# Patient Record
Sex: FEMALE | Race: BLACK OR AFRICAN AMERICAN | NOT HISPANIC OR LATINO | Employment: FULL TIME | URBAN - METROPOLITAN AREA
[De-identification: names, ages, dates, MRNs, and addresses within clinical notes are randomized per-mention and may not be internally consistent; named-entity substitution may affect disease eponyms.]

---

## 2023-04-22 PROBLEM — Z17.0 MALIGNANT NEOPLASM OF UPPER-OUTER QUADRANT OF LEFT BREAST IN FEMALE, ESTROGEN RECEPTOR POSITIVE: Status: ACTIVE | Noted: 2023-04-22

## 2023-04-22 PROBLEM — C50.412 MALIGNANT NEOPLASM OF UPPER-OUTER QUADRANT OF LEFT BREAST IN FEMALE, ESTROGEN RECEPTOR POSITIVE: Status: ACTIVE | Noted: 2023-04-22

## 2023-04-22 PROBLEM — N63.10 MASS OF RIGHT BREAST: Status: ACTIVE | Noted: 2023-04-22

## 2023-05-04 PROBLEM — Z17.0 ESTROGEN RECEPTOR POSITIVE: Status: ACTIVE | Noted: 2023-05-04

## 2023-05-04 PROBLEM — N63.14 MASS OF LOWER INNER QUADRANT OF RIGHT BREAST: Status: ACTIVE | Noted: 2023-04-22

## 2023-05-04 NOTE — PROGRESS NOTES
[facility]  MD Renea Taylor, NP-C    Chief Complaint:   Ghazala Trujillo is a 48 y.o. female presenting today for  3 month follow up for her breast cancer issues. She is due for Physical Examination  She reports no interval changes on her self-breast examination.     History of Present Illness:   Mrs. Trujillo was diagnosed with left breast cancer in September 2021 at the age of 46. ER/AK Positive and HER 2 FISH negative. [employer] Breast Cancer Panel with Add-On Genes Negative (2021). She elected mastectomy that showed a 3.9 cm Grade III IDC with DCIS. Margins and LNs were negative. T2N0. She did not require adjuvant chemotherapy or radiation. She's been on Tamoxifen since October 2021. MDs::: Jacek Vasquez MD; Brando Watts MD; Chica Carter MD.    Past Medical History:   Diagnosis Date    Breast cancer     left, stage 1B    History of abnormal cervical Pap smear     Malignant neoplasm of upper-outer quadrant of left breast in female, estrogen receptor positive 4/22/2023    Mass of lower inner quadrant of right breast 4/22/2023    Mass of right breast 4/22/2023      Past Surgical History:   Procedure Laterality Date    AUGMENTATION OF BREAST Left     after mastectomy    BREAST BIOPSY Left 09/2021    MASTECTOMY Left 10/07/2021        Current Outpatient Medications:     tamoxifen (NOLVADEX) 20 MG Tab, Take 1 tablet by mouth once daily., Disp: , Rfl:    Review of patient's allergies indicates:  No Known Allergies   Social History     Tobacco Use    Smoking status: Never    Smokeless tobacco: Never   Substance Use Topics    Alcohol use: Not on file      Family History   Problem Relation Age of Onset    Diabetes Mother     Dementia Father     Hypertension Father     Diabetes Father     Breast cancer Sister 49        Negative genetic testing    Hyperlipidemia Maternal Grandmother     Dementia Maternal Grandfather     Hyperlipidemia Maternal Grandfather     Stroke  Maternal Grandfather     Heart disease Paternal Grandmother     Heart disease Paternal Grandfather         Review of Systems   Integumentary:  Negative for color change, rash, mole/lesion, breast mass, breast discharge and breast tenderness.   Breast: Negative for mass and tenderness     Physical Exam   Constitutional: She is cooperative.   HENT:   Head: Normocephalic.   Pulmonary/Chest: She exhibits no mass. Right breast exhibits no inverted nipple, no mass, no nipple discharge, no skin change and no tenderness. Left breast exhibits no mass, no skin change and no tenderness. No breast swelling.   The left breast is surgically absent with no signs of malignancy or recurrence.    Abdominal: Normal appearance.   Genitourinary: No breast swelling.   Musculoskeletal: Lymphadenopathy:      Upper Body:      Right upper body: No supraclavicular or axillary adenopathy.      Left upper body: No supraclavicular or axillary adenopathy.     Neurological: She is alert.   Skin: No rash noted.          Assessment/Plan  1. Malignant neoplasm of upper-outer quadrant of left breast in female, estrogen receptor positive  Assessment & Plan:   Patient is doing well and is being followed according to NCCN Guidelines. She understands that her imaging and exams have remained stable and is comfortable being followed in a conservative fashion. She understands the importance of monthly self-breast examination and knows to report any and all changes as they occur.        2. Estrogen receptor positive  Assessment & Plan:  Continue Tamoxifen.      3. Mass of lower inner quadrant of right breast  Assessment & Plan:  We reviewed our findings today and her questions were answered.  She understands that her imaging and exams have remained stable (and show nothing concerning).  She is comfortable being followed in a conservative fashion.      She understands the importance of monthly self-breast examination and knows to report any and all changes as  they occur.               Medical Decision Making:  It is my impression that this patient suffers all conditions contained in this medical document.  Each of these conditions did affect our plan of care and my medical decision making today.  It is my opinion that the medical decision making concerning this patient was of moderate difficulty based on the aforementioned conditions.  Any further recommendations will be communicated to the patient by me.  I have reviewed and verified her allergies, list of medications, medical and surgical histories, social history, and a pertinent review of symptoms.      Follow up:  3 months and prn    For:  PE, Rt. MGM (D) at , and CXR

## 2023-05-05 ENCOUNTER — OFFICE VISIT (OUTPATIENT)
Dept: SURGERY | Facility: CLINIC | Age: 48
End: 2023-05-05
Payer: COMMERCIAL

## 2023-05-05 DIAGNOSIS — Z17.0 MALIGNANT NEOPLASM OF UPPER-OUTER QUADRANT OF LEFT BREAST IN FEMALE, ESTROGEN RECEPTOR POSITIVE: ICD-10-CM

## 2023-05-05 DIAGNOSIS — C50.412 MALIGNANT NEOPLASM OF UPPER-OUTER QUADRANT OF LEFT BREAST IN FEMALE, ESTROGEN RECEPTOR POSITIVE: ICD-10-CM

## 2023-05-05 DIAGNOSIS — N63.14 MASS OF LOWER INNER QUADRANT OF RIGHT BREAST: ICD-10-CM

## 2023-05-05 DIAGNOSIS — Z17.0 ESTROGEN RECEPTOR POSITIVE: ICD-10-CM

## 2023-05-05 LAB
ALBUMIN SERPL BCP-MCNC: 3.8 G/DL (ref 3.5–5.2)
ALP SERPL-CCNC: 44 U/L (ref 55–135)
ALT SERPL W/O P-5'-P-CCNC: 13 U/L (ref 10–44)
ANION GAP SERPL CALC-SCNC: 11 MMOL/L (ref 8–16)
AST SERPL-CCNC: 20 U/L (ref 10–40)
BASOPHILS # BLD AUTO: 0.02 K/UL (ref 0–0.2)
BASOPHILS NFR BLD: 0.4 % (ref 0–1.9)
BILIRUB SERPL-MCNC: 0.4 MG/DL (ref 0.1–1)
BUN SERPL-MCNC: 16 MG/DL (ref 6–20)
CALCIUM SERPL-MCNC: 9.9 MG/DL (ref 8.7–10.5)
CEA SERPL-MCNC: <1.7 NG/ML (ref 0–5)
CHLORIDE SERPL-SCNC: 104 MMOL/L (ref 95–110)
CO2 SERPL-SCNC: 25 MMOL/L (ref 23–29)
CREAT SERPL-MCNC: 0.8 MG/DL (ref 0.5–1.4)
DIFFERENTIAL METHOD: ABNORMAL
EOSINOPHIL # BLD AUTO: 0.2 K/UL (ref 0–0.5)
EOSINOPHIL NFR BLD: 5.2 % (ref 0–8)
ERYTHROCYTE [DISTWIDTH] IN BLOOD BY AUTOMATED COUNT: 14 % (ref 11.5–14.5)
EST. GFR  (NO RACE VARIABLE): >60 ML/MIN/1.73 M^2
GLUCOSE SERPL-MCNC: 90 MG/DL (ref 70–110)
HCT VFR BLD AUTO: 38.8 % (ref 37–48.5)
HGB BLD-MCNC: 12.3 G/DL (ref 12–16)
IMM GRANULOCYTES # BLD AUTO: 0.01 K/UL (ref 0–0.04)
IMM GRANULOCYTES NFR BLD AUTO: 0.2 % (ref 0–0.5)
LDH SERPL L TO P-CCNC: 170 U/L (ref 110–260)
LYMPHOCYTES # BLD AUTO: 2 K/UL (ref 1–4.8)
LYMPHOCYTES NFR BLD: 43.6 % (ref 18–48)
MCH RBC QN AUTO: 28.5 PG (ref 27–31)
MCHC RBC AUTO-ENTMCNC: 31.7 G/DL (ref 32–36)
MCV RBC AUTO: 90 FL (ref 82–98)
MONOCYTES # BLD AUTO: 0.4 K/UL (ref 0.3–1)
MONOCYTES NFR BLD: 8.5 % (ref 4–15)
NEUTROPHILS # BLD AUTO: 1.9 K/UL (ref 1.8–7.7)
NEUTROPHILS NFR BLD: 42.1 % (ref 38–73)
NRBC BLD-RTO: 0 /100 WBC
PLATELET # BLD AUTO: 351 K/UL (ref 150–450)
PMV BLD AUTO: 10.4 FL (ref 9.2–12.9)
POTASSIUM SERPL-SCNC: 4.2 MMOL/L (ref 3.5–5.1)
PROT SERPL-MCNC: 7.3 G/DL (ref 6–8.4)
RBC # BLD AUTO: 4.31 M/UL (ref 4–5.4)
SODIUM SERPL-SCNC: 140 MMOL/L (ref 136–145)
WBC # BLD AUTO: 4.61 K/UL (ref 3.9–12.7)

## 2023-05-05 PROCEDURE — 1160F PR REVIEW ALL MEDS BY PRESCRIBER/CLIN PHARMACIST DOCUMENTED: ICD-10-PCS | Mod: CPTII,S$GLB,, | Performed by: NURSE PRACTITIONER

## 2023-05-05 PROCEDURE — 80053 COMPREHEN METABOLIC PANEL: CPT | Performed by: NURSE PRACTITIONER

## 2023-05-05 PROCEDURE — 1159F MED LIST DOCD IN RCRD: CPT | Mod: CPTII,S$GLB,, | Performed by: NURSE PRACTITIONER

## 2023-05-05 PROCEDURE — 86300 IMMUNOASSAY TUMOR CA 15-3: CPT | Performed by: NURSE PRACTITIONER

## 2023-05-05 PROCEDURE — 99214 PR OFFICE/OUTPT VISIT, EST, LEVL IV, 30-39 MIN: ICD-10-PCS | Mod: S$GLB,,, | Performed by: NURSE PRACTITIONER

## 2023-05-05 PROCEDURE — 83615 LACTATE (LD) (LDH) ENZYME: CPT | Performed by: NURSE PRACTITIONER

## 2023-05-05 PROCEDURE — 1159F PR MEDICATION LIST DOCUMENTED IN MEDICAL RECORD: ICD-10-PCS | Mod: CPTII,S$GLB,, | Performed by: NURSE PRACTITIONER

## 2023-05-05 PROCEDURE — 85025 COMPLETE CBC W/AUTO DIFF WBC: CPT | Performed by: NURSE PRACTITIONER

## 2023-05-05 PROCEDURE — 82378 CARCINOEMBRYONIC ANTIGEN: CPT | Performed by: NURSE PRACTITIONER

## 2023-05-05 PROCEDURE — 1160F RVW MEDS BY RX/DR IN RCRD: CPT | Mod: CPTII,S$GLB,, | Performed by: NURSE PRACTITIONER

## 2023-05-05 PROCEDURE — 99214 OFFICE O/P EST MOD 30 MIN: CPT | Mod: S$GLB,,, | Performed by: NURSE PRACTITIONER

## 2023-05-08 LAB — CANCER AG27-29 SERPL-ACNC: 13 U/ML

## 2023-07-24 DIAGNOSIS — N63.14 MASS OF LOWER INNER QUADRANT OF RIGHT BREAST: Primary | ICD-10-CM

## 2023-07-24 DIAGNOSIS — C50.412 MALIGNANT NEOPLASM OF UPPER-OUTER QUADRANT OF LEFT BREAST IN FEMALE, ESTROGEN RECEPTOR POSITIVE: ICD-10-CM

## 2023-07-24 DIAGNOSIS — Z17.0 MALIGNANT NEOPLASM OF UPPER-OUTER QUADRANT OF LEFT BREAST IN FEMALE, ESTROGEN RECEPTOR POSITIVE: ICD-10-CM

## 2023-07-25 NOTE — PROGRESS NOTES
[facility]  MD Renea Taylor, NP-C    Chief Complaint:   Ghazala Trujillo is a 48 y.o. female presenting today for  3 month follow up for her breast cancer issues. She is due for Physical Examination, Mammogram, and Chest Xray  She reports no interval changes on her self-breast examination.     History of Present Illness:   Mrs. Trujillo was diagnosed with left breast cancer in September 2021 at the age of 46. ER/MT Positive and HER 2 FISH negative. [facility] Breast Cancer Panel with Add-On Genes Negative (2021). She elected mastectomy that showed a 3.9 cm Grade III IDC with DCIS. Margins and LNs were negative. T2N0. She did not require adjuvant chemotherapy or radiation. She's been on Tamoxifen since October 2021. MDs::: Jacek Vasquez MD; Brando Watts MD; Chica Carter MD.    Past Medical History:   Diagnosis Date    Breast cancer     left, stage 1B    History of abnormal cervical Pap smear     Malignant neoplasm of upper-outer quadrant of left breast in female, estrogen receptor positive 4/22/2023    Mass of lower inner quadrant of right breast 4/22/2023    Mass of right breast 4/22/2023      Past Surgical History:   Procedure Laterality Date    AUGMENTATION OF BREAST Left     after mastectomy    BREAST BIOPSY Left 09/2021    MASTECTOMY Left 10/07/2021        Current Outpatient Medications:     tamoxifen (NOLVADEX) 20 MG Tab, Take 1 tablet by mouth once daily., Disp: , Rfl:    Review of patient's allergies indicates:  No Known Allergies   Social History     Tobacco Use    Smoking status: Never    Smokeless tobacco: Never   Substance Use Topics    Alcohol use: Not on file      Family History   Problem Relation Age of Onset    Diabetes Mother     Dementia Father     Hypertension Father     Diabetes Father     Breast cancer Sister 49        Negative genetic testing    Hyperlipidemia Maternal Grandmother     Dementia Maternal Grandfather     Hyperlipidemia Maternal  Grandfather     Stroke Maternal Grandfather     Heart disease Paternal Grandmother     Heart disease Paternal Grandfather         Review of Systems   Integumentary:  Negative for color change, rash, mole/lesion, breast mass, breast discharge and breast tenderness.   Breast: Negative for mass and tenderness       Physical Exam   Constitutional: She is cooperative.   HENT:   Head: Normocephalic.   Pulmonary/Chest: She exhibits no mass. Right breast exhibits no inverted nipple, no mass, no nipple discharge, no skin change and no tenderness. Left breast exhibits no mass, no skin change and no tenderness. No breast swelling.   The left breast is surgically absent with no signs of malignancy or recurrence.    Abdominal: Normal appearance.   Genitourinary: No breast swelling.   Musculoskeletal: Lymphadenopathy:      Upper Body:      Right upper body: No supraclavicular or axillary adenopathy.      Left upper body: No supraclavicular or axillary adenopathy.     Neurological: She is alert.   Skin: No rash noted.        MAMMOGRAM REPORT: The patient has had left mastectomy. The patient has had right breast reduction mammoplasty. The patient has had right breast core biopsy. There are scattered fibroglandular elements noted. There are no suspicious masses or suspicious calcifications seen to suggest malignancy. Scattered, circumscribed, benign-appearing masses are demonstrated. Benign-type calcifications are identified. Stable benign-appearing asymmetry noted in the lower inner right breast consistent with post reduction change. There has been no suspicious interval mammographic change in the right breast.       CXR REPORT: Her lungs are well aerated and without worrisome masses; no pleural effusion noted; NEM     NOTE:::We viewed her films together at today's visit.  We discussed the multiple views obtained and the important findings.  Even benign changes were mentioned and her questions were answered.  She knows that she may  receive a formal letter or report from the Radiologist.  She is to contact us if she has questions.        Assessment/Plan  1. Malignant neoplasm of upper-outer quadrant of left breast in female, estrogen receptor positive  Assessment & Plan:  She is doing well and will continue to be followed according to NCCN Guidelines. She understands that her imaging and exams have remained stable and is comfortable being followed in a conservative fashion. She understands the importance of monthly self-breast examination and knows to report any and all changes as they occur.        2. Estrogen receptor positive  Assessment & Plan:  Continue Tamoxifen.      3. Mass of lower inner quadrant of right breast  Assessment & Plan:  We reviewed our findings today and her questions were answered.  She understands that her imaging and exams have remained stable (and show nothing concerning).  She is comfortable being followed in a conservative fashion.      She understands the importance of monthly self-breast examination and knows to report any and all changes as they occur.               Medical Decision Making:  It is my impression that this patient suffers all conditions contained in this medical document.  Each of these conditions did affect our plan of care and my medical decision making today.  It is my opinion that the medical decision making concerning this patient was of moderate difficulty based on the aforementioned conditions.  Any further recommendations will be communicated to the patient by me.  I have reviewed and verified her allergies, list of medications, medical and surgical histories, social history, and a pertinent review of symptoms.      Follow up:  3 months and prn    For:  PE

## 2023-08-04 ENCOUNTER — OFFICE VISIT (OUTPATIENT)
Dept: SURGERY | Facility: CLINIC | Age: 48
End: 2023-08-04
Payer: COMMERCIAL

## 2023-08-04 DIAGNOSIS — Z17.0 MALIGNANT NEOPLASM OF UPPER-OUTER QUADRANT OF LEFT BREAST IN FEMALE, ESTROGEN RECEPTOR POSITIVE: Primary | ICD-10-CM

## 2023-08-04 DIAGNOSIS — Z17.0 ESTROGEN RECEPTOR POSITIVE: ICD-10-CM

## 2023-08-04 DIAGNOSIS — C50.412 MALIGNANT NEOPLASM OF UPPER-OUTER QUADRANT OF LEFT BREAST IN FEMALE, ESTROGEN RECEPTOR POSITIVE: Primary | ICD-10-CM

## 2023-08-04 DIAGNOSIS — N63.14 MASS OF LOWER INNER QUADRANT OF RIGHT BREAST: ICD-10-CM

## 2023-08-04 LAB
ALBUMIN SERPL BCP-MCNC: 3.8 G/DL (ref 3.5–5.2)
ALP SERPL-CCNC: 45 U/L (ref 55–135)
ALT SERPL W/O P-5'-P-CCNC: 10 U/L (ref 10–44)
ANION GAP SERPL CALC-SCNC: 14 MMOL/L (ref 8–16)
AST SERPL-CCNC: 17 U/L (ref 10–40)
BILIRUB SERPL-MCNC: 0.3 MG/DL (ref 0.1–1)
BUN SERPL-MCNC: 16 MG/DL (ref 6–20)
CALCIUM SERPL-MCNC: 9.7 MG/DL (ref 8.7–10.5)
CHLORIDE SERPL-SCNC: 106 MMOL/L (ref 95–110)
CO2 SERPL-SCNC: 20 MMOL/L (ref 23–29)
CREAT SERPL-MCNC: 0.7 MG/DL (ref 0.5–1.4)
EST. GFR  (NO RACE VARIABLE): >60 ML/MIN/1.73 M^2
GLUCOSE SERPL-MCNC: 92 MG/DL (ref 70–110)
LDH SERPL L TO P-CCNC: 192 U/L (ref 110–260)
POTASSIUM SERPL-SCNC: 4.1 MMOL/L (ref 3.5–5.1)
PROT SERPL-MCNC: 7.5 G/DL (ref 6–8.4)
SODIUM SERPL-SCNC: 140 MMOL/L (ref 136–145)

## 2023-08-04 PROCEDURE — 1159F MED LIST DOCD IN RCRD: CPT | Mod: CPTII,S$GLB,, | Performed by: NURSE PRACTITIONER

## 2023-08-04 PROCEDURE — 99214 PR OFFICE/OUTPT VISIT, EST, LEVL IV, 30-39 MIN: ICD-10-PCS | Mod: S$GLB,,, | Performed by: NURSE PRACTITIONER

## 2023-08-04 PROCEDURE — 83615 LACTATE (LD) (LDH) ENZYME: CPT | Performed by: NURSE PRACTITIONER

## 2023-08-04 PROCEDURE — 82378 CARCINOEMBRYONIC ANTIGEN: CPT | Performed by: NURSE PRACTITIONER

## 2023-08-04 PROCEDURE — 85025 COMPLETE CBC W/AUTO DIFF WBC: CPT | Performed by: NURSE PRACTITIONER

## 2023-08-04 PROCEDURE — 99214 OFFICE O/P EST MOD 30 MIN: CPT | Mod: S$GLB,,, | Performed by: NURSE PRACTITIONER

## 2023-08-04 PROCEDURE — 80053 COMPREHEN METABOLIC PANEL: CPT | Performed by: NURSE PRACTITIONER

## 2023-08-04 PROCEDURE — 1159F PR MEDICATION LIST DOCUMENTED IN MEDICAL RECORD: ICD-10-PCS | Mod: CPTII,S$GLB,, | Performed by: NURSE PRACTITIONER

## 2023-08-04 PROCEDURE — 86300 IMMUNOASSAY TUMOR CA 15-3: CPT | Performed by: NURSE PRACTITIONER

## 2023-08-04 RX ORDER — MULTIVITAMIN
1 TABLET ORAL EVERY MORNING
COMMUNITY

## 2023-08-04 RX ORDER — ACETAMINOPHEN, DIPHENHYDRAMINE HCL, PHENYLEPHRINE HCL 325; 25; 5 MG/1; MG/1; MG/1
TABLET ORAL
COMMUNITY

## 2023-08-05 LAB
BASOPHILS # BLD AUTO: 0.02 K/UL (ref 0–0.2)
BASOPHILS NFR BLD: 0.4 % (ref 0–1.9)
CEA SERPL-MCNC: <1.7 NG/ML (ref 0–5)
DIFFERENTIAL METHOD: ABNORMAL
EOSINOPHIL # BLD AUTO: 0.2 K/UL (ref 0–0.5)
EOSINOPHIL NFR BLD: 2.7 % (ref 0–8)
ERYTHROCYTE [DISTWIDTH] IN BLOOD BY AUTOMATED COUNT: 14.4 % (ref 11.5–14.5)
HCT VFR BLD AUTO: 38.2 % (ref 37–48.5)
HGB BLD-MCNC: 11.8 G/DL (ref 12–16)
IMM GRANULOCYTES # BLD AUTO: 0.01 K/UL (ref 0–0.04)
IMM GRANULOCYTES NFR BLD AUTO: 0.2 % (ref 0–0.5)
LYMPHOCYTES # BLD AUTO: 2.1 K/UL (ref 1–4.8)
LYMPHOCYTES NFR BLD: 37.8 % (ref 18–48)
MCH RBC QN AUTO: 27.9 PG (ref 27–31)
MCHC RBC AUTO-ENTMCNC: 30.9 G/DL (ref 32–36)
MCV RBC AUTO: 90 FL (ref 82–98)
MONOCYTES # BLD AUTO: 0.4 K/UL (ref 0.3–1)
MONOCYTES NFR BLD: 7.4 % (ref 4–15)
NEUTROPHILS # BLD AUTO: 2.9 K/UL (ref 1.8–7.7)
NEUTROPHILS NFR BLD: 51.5 % (ref 38–73)
NRBC BLD-RTO: 0 /100 WBC
PLATELET # BLD AUTO: 317 K/UL (ref 150–450)
PMV BLD AUTO: 10.4 FL (ref 9.2–12.9)
RBC # BLD AUTO: 4.23 M/UL (ref 4–5.4)
WBC # BLD AUTO: 5.55 K/UL (ref 3.9–12.7)

## 2023-08-09 LAB — CANCER AG27-29 SERPL-ACNC: 12 U/ML

## 2023-10-29 NOTE — PROGRESS NOTES
[facility]  Ayan Cedillo MD, FACS  Renea Devries NP-C      Date of Service: 11/6/2023    Chief Complaint:   Ghazala Trujillo is a 48 y.o. female presenting today for her 3 month follow up to her breast cancer diagnosis.  She is due for a Physical Examination.  She reports no interval changes.     History of Present Illness:   Mrs. Trujillo was diagnosed with left breast cancer in September 2021 at the age of 46. ER/UT Positive and HER 2 FISH negative. [facility] Breast Cancer Panel with Add-On Genes Negative (2021). She elected mastectomy that showed a 3.9 cm Grade III IDC with DCIS. Margins and LNs were negative. T2N0. She did not require adjuvant chemotherapy or radiation. She's been on Tamoxifen since October 2021. MDs::: Jacek Vasquez MD; Brando Watts MD; Chica Carter MD.    Past Medical History:   Diagnosis Date    Breast cancer 09/2021    left, stage 1B    History of abnormal cervical Pap smear     Malignant neoplasm of upper-outer quadrant of left breast in female, estrogen receptor positive 04/22/2023    Mass of lower inner quadrant of right breast 04/22/2023    Mass of right breast 04/22/2023      Past Surgical History:   Procedure Laterality Date    AUGMENTATION OF BREAST Left     after mastectomy    BREAST BIOPSY Left 09/2021    MASTECTOMY Left 10/07/2021        Current Outpatient Medications:     ascorbic acid, vitamin C, (VITAMIN C) 100 MG tablet, Take 1 tablet by mouth every morning., Disp: , Rfl:     cyanocobalamin, vitamin B-12, 5,000 mcg Subl, Place under the tongue., Disp: , Rfl:     fezolinetant (VEOZAH) 45 mg Tab, Take 45 mg by mouth Daily., Disp: 30 tablet, Rfl: 11    multivitamin (THERAGRAN) per tablet, Take 1 tablet by mouth every morning., Disp: , Rfl:     tamoxifen (NOLVADEX) 20 MG Tab, Take 1 tablet by mouth once daily., Disp: , Rfl:    Review of patient's allergies indicates:  No Known Allergies   Social History     Tobacco Use    Smoking status:  Never    Smokeless tobacco: Never   Substance Use Topics    Alcohol use: Never      Family History   Problem Relation Age of Onset    Diabetes Mother     Hyperlipidemia Mother     Dementia Father     Hypertension Father     Diabetes Father     Breast cancer Sister 49        Negative genetic testing    Hyperlipidemia Maternal Grandmother     Stroke Maternal Grandmother     Dementia Maternal Grandfather     Hyperlipidemia Maternal Grandfather     Stroke Maternal Grandfather     Heart disease Paternal Grandmother     Heart disease Paternal Grandfather         Review of Systems   Integumentary:  Negative for color change, rash, mole/lesion, breast mass, breast discharge and breast tenderness.   Breast: Negative for mass and tenderness       Physical Exam   Constitutional: She is cooperative.   HENT:   Head: Normocephalic.   Pulmonary/Chest: She exhibits no mass. Right breast exhibits no inverted nipple, no mass, no nipple discharge, no skin change and no tenderness. Left breast exhibits no mass, no skin change and no tenderness. No breast swelling.   The left breast is surgically absent with no signs of malignancy or recurrence.    Abdominal: Normal appearance.   Genitourinary: No breast swelling.   Musculoskeletal: Lymphadenopathy:      Upper Body:      Right upper body: No supraclavicular or axillary adenopathy.      Left upper body: No supraclavicular or axillary adenopathy.     Neurological: She is alert.   Skin: No rash noted.          ASSESSMENT and PLAN OF CARE     1. Malignant neoplasm of upper-outer quadrant of left breast in female, estrogen receptor positive  Assessment & Plan:  She is doing well and will continue to be followed according to NCCN Guidelines. She understands that her  exams have remained stable and is comfortable being followed in a conservative fashion. She understands the importance of monthly self-breast examination and knows to report any and all changes as they occur.        2. Estrogen  receptor positive  Assessment & Plan:  Continue Tamoxifen      3. Mass of lower inner quadrant of right breast  Assessment & Plan:  We reviewed our findings today and her questions were answered.  She understands that her  exams have remained stable (and show nothing concerning).  She is comfortable being followed in a conservative fashion.      She understands the importance of monthly self-breast examination and knows to report any and all changes as they occur.        Medical Decision Making: It is my impression that this patient suffers all conditions contained in this medical document.  Each of these conditions did affect our plan of care and my medical decision making today.  It is my opinion that the medical decision making concerning this patient was of moderate difficulty based on the aforementioned conditions.  Any further recommendations will be communicated to the patient by me.  I have reviewed and verified her allergies, list of medications, medical and surgical histories, social history, and a pertinent review of symptoms.    Follow up:  3 Months and PRN     For:Ultrasound with Dr. Cedillo

## 2023-10-29 NOTE — ASSESSMENT & PLAN NOTE
We reviewed our findings today and her questions were answered.  She understands that her  exams have remained stable (and show nothing concerning).  She is comfortable being followed in a conservative fashion.      She understands the importance of monthly self-breast examination and knows to report any and all changes as they occur.

## 2023-11-06 ENCOUNTER — OFFICE VISIT (OUTPATIENT)
Dept: SURGERY | Facility: CLINIC | Age: 48
End: 2023-11-06
Payer: COMMERCIAL

## 2023-11-06 DIAGNOSIS — C50.412 MALIGNANT NEOPLASM OF UPPER-OUTER QUADRANT OF LEFT BREAST IN FEMALE, ESTROGEN RECEPTOR POSITIVE: Primary | ICD-10-CM

## 2023-11-06 DIAGNOSIS — N63.14 MASS OF LOWER INNER QUADRANT OF RIGHT BREAST: ICD-10-CM

## 2023-11-06 DIAGNOSIS — Z17.0 ESTROGEN RECEPTOR POSITIVE: ICD-10-CM

## 2023-11-06 DIAGNOSIS — Z17.0 MALIGNANT NEOPLASM OF UPPER-OUTER QUADRANT OF LEFT BREAST IN FEMALE, ESTROGEN RECEPTOR POSITIVE: Primary | ICD-10-CM

## 2023-11-06 LAB
ALBUMIN SERPL BCP-MCNC: 3.7 G/DL (ref 3.5–5.2)
ALP SERPL-CCNC: 42 U/L (ref 55–135)
ALT SERPL W/O P-5'-P-CCNC: 13 U/L (ref 10–44)
ANION GAP SERPL CALC-SCNC: 10 MMOL/L (ref 8–16)
AST SERPL-CCNC: 17 U/L (ref 10–40)
BASOPHILS # BLD AUTO: 0.02 K/UL (ref 0–0.2)
BASOPHILS NFR BLD: 0.4 % (ref 0–1.9)
BILIRUB SERPL-MCNC: 0.3 MG/DL (ref 0.1–1)
BUN SERPL-MCNC: 18 MG/DL (ref 6–20)
CALCIUM SERPL-MCNC: 9.3 MG/DL (ref 8.7–10.5)
CEA SERPL-MCNC: 1.8 NG/ML (ref 0–5)
CHLORIDE SERPL-SCNC: 108 MMOL/L (ref 95–110)
CO2 SERPL-SCNC: 24 MMOL/L (ref 23–29)
CREAT SERPL-MCNC: 0.8 MG/DL (ref 0.5–1.4)
DIFFERENTIAL METHOD: ABNORMAL
EOSINOPHIL # BLD AUTO: 0.2 K/UL (ref 0–0.5)
EOSINOPHIL NFR BLD: 3.8 % (ref 0–8)
ERYTHROCYTE [DISTWIDTH] IN BLOOD BY AUTOMATED COUNT: 14.1 % (ref 11.5–14.5)
EST. GFR  (NO RACE VARIABLE): >60 ML/MIN/1.73 M^2
GLUCOSE SERPL-MCNC: 92 MG/DL (ref 70–110)
HCT VFR BLD AUTO: 37.6 % (ref 37–48.5)
HGB BLD-MCNC: 11.6 G/DL (ref 12–16)
IMM GRANULOCYTES # BLD AUTO: 0.02 K/UL (ref 0–0.04)
IMM GRANULOCYTES NFR BLD AUTO: 0.4 % (ref 0–0.5)
LDH SERPL L TO P-CCNC: 169 U/L (ref 110–260)
LYMPHOCYTES # BLD AUTO: 2 K/UL (ref 1–4.8)
LYMPHOCYTES NFR BLD: 39.8 % (ref 18–48)
MCH RBC QN AUTO: 28 PG (ref 27–31)
MCHC RBC AUTO-ENTMCNC: 30.9 G/DL (ref 32–36)
MCV RBC AUTO: 91 FL (ref 82–98)
MONOCYTES # BLD AUTO: 0.4 K/UL (ref 0.3–1)
MONOCYTES NFR BLD: 7.7 % (ref 4–15)
NEUTROPHILS # BLD AUTO: 2.4 K/UL (ref 1.8–7.7)
NEUTROPHILS NFR BLD: 47.9 % (ref 38–73)
NRBC BLD-RTO: 0 /100 WBC
PLATELET # BLD AUTO: 312 K/UL (ref 150–450)
PMV BLD AUTO: 9.8 FL (ref 9.2–12.9)
POTASSIUM SERPL-SCNC: 4 MMOL/L (ref 3.5–5.1)
PROT SERPL-MCNC: 7 G/DL (ref 6–8.4)
RBC # BLD AUTO: 4.14 M/UL (ref 4–5.4)
SODIUM SERPL-SCNC: 142 MMOL/L (ref 136–145)
WBC # BLD AUTO: 4.95 K/UL (ref 3.9–12.7)

## 2023-11-06 PROCEDURE — 83615 LACTATE (LD) (LDH) ENZYME: CPT | Performed by: NURSE PRACTITIONER

## 2023-11-06 PROCEDURE — 85025 COMPLETE CBC W/AUTO DIFF WBC: CPT | Performed by: NURSE PRACTITIONER

## 2023-11-06 PROCEDURE — 82378 CARCINOEMBRYONIC ANTIGEN: CPT | Performed by: NURSE PRACTITIONER

## 2023-11-06 PROCEDURE — 99214 PR OFFICE/OUTPT VISIT, EST, LEVL IV, 30-39 MIN: ICD-10-PCS | Mod: S$GLB,,, | Performed by: NURSE PRACTITIONER

## 2023-11-06 PROCEDURE — 99214 OFFICE O/P EST MOD 30 MIN: CPT | Mod: S$GLB,,, | Performed by: NURSE PRACTITIONER

## 2023-11-06 PROCEDURE — 99999 PR PBB SHADOW E&M-EST. PATIENT-LVL II: ICD-10-PCS | Mod: PBBFAC,,, | Performed by: NURSE PRACTITIONER

## 2023-11-06 PROCEDURE — 80053 COMPREHEN METABOLIC PANEL: CPT | Performed by: NURSE PRACTITIONER

## 2023-11-06 PROCEDURE — 86300 IMMUNOASSAY TUMOR CA 15-3: CPT | Performed by: NURSE PRACTITIONER

## 2023-11-06 PROCEDURE — 99999 PR PBB SHADOW E&M-EST. PATIENT-LVL II: CPT | Mod: PBBFAC,,, | Performed by: NURSE PRACTITIONER

## 2023-11-08 LAB — CANCER AG27-29 SERPL-ACNC: 13.6 U/ML

## 2024-01-19 NOTE — ASSESSMENT & PLAN NOTE
Patient is doing well and is being followed according to NCCN Guidelines. She understands that her imaging and exams have remained stable and is comfortable being followed in a conservative fashion. She understands the importance of monthly self-breast examination and knows to report any and all changes as they occur.    NOTE:::We viewed her films together at today's visit.  We discussed the multiple views obtained and the important findings.  Even benign changes were mentioned and her questions were answered.     She should continue her Tamoxifen as directed.  We discussed some of the side effects and methods to combat these.  Should she have any issues, she can contact us as needed.    Labs obtained today: CBC, Chem 12, CEA, LDH, CA27/29 - after resulted, these will be compared to her previous values and all abnormal values will be phoned to her for discussion.

## 2024-01-19 NOTE — PROGRESS NOTES
Date of Service: 2/6/2024    Chief Complaint:   Ghazala Trujillo is a 48 y.o. female presenting today for her 3-month evaluation given her previous diagnosis of breast cancer. She is due for an ultrasound.  She reports no interval changes.     History of Present Illness:   Mrs. Trujillo was diagnosed with left breast cancer in September 2021 at the age of 46. ER/MD Positive and HER 2 FISH negative. [facility] Breast Cancer Panel with Add-On Genes Negative (2021). She elected mastectomy that showed a 3.9 cm Grade III IDC with DCIS. Margins and LNs were negative. T2N0. She did not require adjuvant chemotherapy or radiation. She's been on Tamoxifen since October 2021. MDs::: Jacek Vasquez MD; Brando Watts MD; Chica Carter MD.    Past Medical History:   Diagnosis Date    Breast cancer 09/2021    left, stage 1B    History of abnormal cervical Pap smear     Malignant neoplasm of upper-outer quadrant of left breast in female, estrogen receptor positive 04/22/2023    Mass of lower inner quadrant of right breast 04/22/2023    Mass of right breast 04/22/2023      Past Surgical History:   Procedure Laterality Date    AUGMENTATION OF BREAST Left     after mastectomy    BREAST BIOPSY Left 09/2021    MASTECTOMY Left 10/07/2021        Current Outpatient Medications:     ascorbic acid, vitamin C, (VITAMIN C) 100 MG tablet, Take 1 tablet by mouth every morning., Disp: , Rfl:     cyanocobalamin, vitamin B-12, 5,000 mcg Subl, Place under the tongue., Disp: , Rfl:     fezolinetant (VEOZAH) 45 mg Tab, Take 45 mg by mouth Daily., Disp: 30 tablet, Rfl: 11    multivitamin (THERAGRAN) per tablet, Take 1 tablet by mouth every morning., Disp: , Rfl:     tamoxifen (NOLVADEX) 20 MG Tab, Take 1 tablet by mouth once daily., Disp: , Rfl:    Review of patient's allergies indicates:  No Known Allergies   Social History     Tobacco Use    Smoking status: Never    Smokeless tobacco: Never   Substance Use Topics    Alcohol use: Never      Family  History   Problem Relation Age of Onset    Diabetes Mother     Hyperlipidemia Mother     Dementia Father     Hypertension Father     Diabetes Father     Breast cancer Sister 49        Negative genetic testing    Hyperlipidemia Maternal Grandmother     Stroke Maternal Grandmother     Dementia Maternal Grandfather     Hyperlipidemia Maternal Grandfather     Stroke Maternal Grandfather     Heart disease Paternal Grandmother     Heart disease Paternal Grandfather         Review of Systems   Integumentary:  Negative for color change, rash, mole/lesion, breast mass, breast discharge and breast tenderness.   Breast: Negative for mass and tenderness       Physical Exam   Constitutional: She is cooperative.   HENT:   Head: Normocephalic.   Pulmonary/Chest: She exhibits no mass. Right breast exhibits no inverted nipple, no mass, no nipple discharge, no skin change and no tenderness. Left breast exhibits no mass, no skin change and no tenderness. No breast swelling.   The left breast is surgically absent with no signs of malignancy or recurrence.    Abdominal: Normal appearance.   Genitourinary: No breast swelling.   Musculoskeletal: Lymphadenopathy:      Upper Body:      Right upper body: No supraclavicular or axillary adenopathy.      Left upper body: No supraclavicular or axillary adenopathy.     Neurological: She is alert.   Skin: No rash noted.        ULTRASOUND EVALUATION and REPORT    Bilateral real-time Ultrasound was performed by me.  All four quadrants of the right breast, retroareolar area, chest wall (from the clavicle to inframammary fold and from sternum to the anterior border of the latissimus) and axillary basins were scanned.      Her left breast is surgically absent.    Right Breast: She has normal tissues in the right breast; there's no disruption of the tissue planes and no abnormal shadowing; she has normal skin thickness with no subcutaneous nodules or skin thickening; NEM     Left Breast: She has normal  tissues in the left breast;  she has normal skin thickness with no subcutaneous nodules or skin thickening; NEM.    Axillae: There are no abnormal lymph nodes seen bilaterally.     Impression: Normal tissue bilaterally with no solid/suspicious masses noted. No LAD in bilateral axillae.      BIRADS: Category 2 - Benign Finding.    Findings were discussed with patient in real time and a hand written report was given to her at the conclusion of the exam.        ASSESSMENT and PLAN OF CARE     1. Malignant neoplasm of upper-outer quadrant of left breast in female, estrogen receptor positive  Assessment & Plan:  Patient is doing well and is being followed according to NCCN Guidelines. She understands that her imaging and exams have remained stable and is comfortable being followed in a conservative fashion. She understands the importance of monthly self-breast examination and knows to report any and all changes as they occur.    NOTE:::We viewed her films together at today's visit.  We discussed the multiple views obtained and the important findings.  Even benign changes were mentioned and her questions were answered.     She should continue her Tamoxifen as directed.  We discussed some of the side effects and methods to combat these.  Should she have any issues, she can contact us as needed.    Labs obtained today: CBC, Chem 12, CEA, LDH, CA27/29 - after resulted, these will be compared to her previous values and all abnormal values will be phoned to her for discussion.        Medical Decision Making: HT - It is my impression that the patient suffers from all the listed conditions.  Each of these conditions did affect my Plan of Care and all medical decisions that were rendered.  The medical decision making was of high difficulty based on her comorbidities and her previous diagnosis of breast cancer, which can pose a direct threat to her life.  This patient is currently being treated with ongoing Hormonal Agents that  pose inherent risks. Laboratory evaluations are currently pending but will be reviewed in the next 24 hours. Any further recommendations will be communicated to the patient by me.  I have reviewed and verified her allergies, list of medications, medical and surgical histories, social history, and a pertinent review of symptoms.      Follow up: 6 months and prn     For:  Physical Examination, MGM (D) at , CXR, and LABS

## 2024-02-06 ENCOUNTER — OFFICE VISIT (OUTPATIENT)
Dept: SURGERY | Facility: CLINIC | Age: 49
End: 2024-02-06
Payer: COMMERCIAL

## 2024-02-06 DIAGNOSIS — Z17.0 MALIGNANT NEOPLASM OF UPPER-OUTER QUADRANT OF LEFT BREAST IN FEMALE, ESTROGEN RECEPTOR POSITIVE: Primary | ICD-10-CM

## 2024-02-06 DIAGNOSIS — C50.412 MALIGNANT NEOPLASM OF UPPER-OUTER QUADRANT OF LEFT BREAST IN FEMALE, ESTROGEN RECEPTOR POSITIVE: Primary | ICD-10-CM

## 2024-02-06 PROCEDURE — 86300 IMMUNOASSAY TUMOR CA 15-3: CPT | Performed by: SPECIALIST

## 2024-02-06 PROCEDURE — 1159F MED LIST DOCD IN RCRD: CPT | Mod: CPTII,S$GLB,, | Performed by: SPECIALIST

## 2024-02-06 PROCEDURE — 80053 COMPREHEN METABOLIC PANEL: CPT | Performed by: SPECIALIST

## 2024-02-06 PROCEDURE — 99214 OFFICE O/P EST MOD 30 MIN: CPT | Mod: S$GLB,,, | Performed by: SPECIALIST

## 2024-02-06 PROCEDURE — 1160F RVW MEDS BY RX/DR IN RCRD: CPT | Mod: CPTII,S$GLB,, | Performed by: SPECIALIST

## 2024-02-06 PROCEDURE — 82378 CARCINOEMBRYONIC ANTIGEN: CPT | Performed by: SPECIALIST

## 2024-02-06 PROCEDURE — 85025 COMPLETE CBC W/AUTO DIFF WBC: CPT | Performed by: SPECIALIST

## 2024-02-06 PROCEDURE — 83615 LACTATE (LD) (LDH) ENZYME: CPT | Performed by: SPECIALIST

## 2024-02-06 PROCEDURE — 99999 PR PBB SHADOW E&M-EST. PATIENT-LVL II: CPT | Mod: PBBFAC,,, | Performed by: SPECIALIST

## 2024-02-07 LAB
ALBUMIN SERPL BCP-MCNC: 4 G/DL (ref 3.5–5.2)
ALP SERPL-CCNC: 39 U/L (ref 55–135)
ALT SERPL W/O P-5'-P-CCNC: 15 U/L (ref 10–44)
ANION GAP SERPL CALC-SCNC: 12 MMOL/L (ref 8–16)
AST SERPL-CCNC: 19 U/L (ref 10–40)
BASOPHILS # BLD AUTO: 0.02 K/UL (ref 0–0.2)
BASOPHILS NFR BLD: 0.4 % (ref 0–1.9)
BILIRUB SERPL-MCNC: 0.3 MG/DL (ref 0.1–1)
BUN SERPL-MCNC: 16 MG/DL (ref 6–20)
CALCIUM SERPL-MCNC: 10.2 MG/DL (ref 8.7–10.5)
CEA SERPL-MCNC: <1.7 NG/ML (ref 0–5)
CHLORIDE SERPL-SCNC: 105 MMOL/L (ref 95–110)
CO2 SERPL-SCNC: 23 MMOL/L (ref 23–29)
CREAT SERPL-MCNC: 0.8 MG/DL (ref 0.5–1.4)
DIFFERENTIAL METHOD BLD: ABNORMAL
EOSINOPHIL # BLD AUTO: 0.2 K/UL (ref 0–0.5)
EOSINOPHIL NFR BLD: 4.1 % (ref 0–8)
ERYTHROCYTE [DISTWIDTH] IN BLOOD BY AUTOMATED COUNT: 14 % (ref 11.5–14.5)
EST. GFR  (NO RACE VARIABLE): >60 ML/MIN/1.73 M^2
GLUCOSE SERPL-MCNC: 83 MG/DL (ref 70–110)
HCT VFR BLD AUTO: 38.5 % (ref 37–48.5)
HGB BLD-MCNC: 12.2 G/DL (ref 12–16)
IMM GRANULOCYTES # BLD AUTO: 0.01 K/UL (ref 0–0.04)
IMM GRANULOCYTES NFR BLD AUTO: 0.2 % (ref 0–0.5)
LDH SERPL L TO P-CCNC: 170 U/L (ref 110–260)
LYMPHOCYTES # BLD AUTO: 2.4 K/UL (ref 1–4.8)
LYMPHOCYTES NFR BLD: 49 % (ref 18–48)
MCH RBC QN AUTO: 28.2 PG (ref 27–31)
MCHC RBC AUTO-ENTMCNC: 31.7 G/DL (ref 32–36)
MCV RBC AUTO: 89 FL (ref 82–98)
MONOCYTES # BLD AUTO: 0.3 K/UL (ref 0.3–1)
MONOCYTES NFR BLD: 6.8 % (ref 4–15)
NEUTROPHILS # BLD AUTO: 1.9 K/UL (ref 1.8–7.7)
NEUTROPHILS NFR BLD: 39.5 % (ref 38–73)
NRBC BLD-RTO: 0 /100 WBC
PLATELET # BLD AUTO: 327 K/UL (ref 150–450)
PMV BLD AUTO: 10.8 FL (ref 9.2–12.9)
POTASSIUM SERPL-SCNC: 4 MMOL/L (ref 3.5–5.1)
PROT SERPL-MCNC: 7.4 G/DL (ref 6–8.4)
RBC # BLD AUTO: 4.32 M/UL (ref 4–5.4)
SODIUM SERPL-SCNC: 140 MMOL/L (ref 136–145)
WBC # BLD AUTO: 4.82 K/UL (ref 3.9–12.7)

## 2024-02-09 LAB — CANCER AG27-29 SERPL-ACNC: 14.8 U/ML

## 2024-09-30 DIAGNOSIS — C50.412 MALIGNANT NEOPLASM OF UPPER-OUTER QUADRANT OF LEFT BREAST IN FEMALE, ESTROGEN RECEPTOR POSITIVE: Primary | ICD-10-CM

## 2024-09-30 DIAGNOSIS — Z17.0 MALIGNANT NEOPLASM OF UPPER-OUTER QUADRANT OF LEFT BREAST IN FEMALE, ESTROGEN RECEPTOR POSITIVE: Primary | ICD-10-CM

## 2024-09-30 NOTE — PROGRESS NOTES
Date of Service: 10/21/2024    Chief Complaint:   Ghazala Trujillo is a 49 y.o. female presenting today for her 6 month follow up due to her breast cancer diagnosis.  She is due for her Mammogram and CXR.  She reports no interval changes on her self-breast examination.     History of Present Illness:   Mrs. Trujillo was diagnosed with left breast cancer in September 2021 at the age of 46. ER/MA Positive and HER 2 FISH negative. [facility] Breast Cancer Panel with Add-On Genes Negative (2021). She elected mastectomy that showed a 3.9 cm Grade III IDC with DCIS. Margins and LNs were negative. T2N0. She did not require adjuvant chemotherapy or radiation. She's been on Tamoxifen since October 2021. MDs::: Jacek Vasquez MD; Brando Watts MD; Chica Carter MD.    Past Medical History:   Diagnosis Date    Breast cancer 09/2021    left, stage 1B    History of abnormal cervical Pap smear     Malignant neoplasm of upper-outer quadrant of left breast in female, estrogen receptor positive 04/22/2023    Mass of lower inner quadrant of right breast 04/22/2023    Mass of right breast 04/22/2023      Past Surgical History:   Procedure Laterality Date    AUGMENTATION OF BREAST Left     after mastectomy    BREAST BIOPSY Left 09/2021    MASTECTOMY Left 10/07/2021        Current Outpatient Medications:     ascorbic acid, vitamin C, (VITAMIN C) 100 MG tablet, Take 1 tablet by mouth every morning., Disp: , Rfl:     cyanocobalamin, vitamin B-12, 5,000 mcg Subl, Place under the tongue., Disp: , Rfl:     fezolinetant (VEOZAH) 45 mg Tab, Take 45 mg by mouth Daily., Disp: 90 tablet, Rfl: 4    multivitamin (THERAGRAN) per tablet, Take 1 tablet by mouth every morning., Disp: , Rfl:     tamoxifen (NOLVADEX) 20 MG Tab, Take 1 tablet by mouth once daily., Disp: , Rfl:    Review of patient's allergies indicates:  No Known Allergies   Social History     Tobacco Use    Smoking status: Never    Smokeless tobacco: Never   Substance Use Topics     Alcohol use: Never      Family History   Problem Relation Name Age of Onset    Diabetes Mother Bailey Trujillo     Hyperlipidemia Mother Bailey Trujillo     Dementia Father Manpreet Trujillo     Hypertension Father Manpreet Trujillo     Diabetes Father Manpreet Trujillo     Breast cancer Sister Leyla Vinson        Negative genetic testing    Hyperlipidemia Maternal Grandmother Manpreet Rawls Sr.     Stroke Maternal Grandmother Manpreet Rawls Sr.     Dementia Maternal Grandfather      Hyperlipidemia Maternal Grandfather      Stroke Maternal Grandfather      Heart disease Paternal Grandmother      Heart disease Paternal Grandfather          Review of Systems   Integumentary:  Negative for color change, rash, mole/lesion, breast mass, breast discharge and breast tenderness.   Breast: Negative for mass and tenderness       Physical Exam   Constitutional: She is cooperative.   HENT:   Head: Normocephalic.   Pulmonary/Chest: She exhibits no mass. Right breast exhibits no inverted nipple, no mass, no nipple discharge, no skin change and no tenderness. Left breast exhibits no mass, no skin change and no tenderness. No breast swelling.   The left breast is surgically absent with no signs of malignancy or recurrence.    Abdominal: Normal appearance.   Genitourinary: No breast swelling.   Musculoskeletal: Lymphadenopathy:      Upper Body:      Right upper body: No supraclavicular or axillary adenopathy.      Left upper body: No supraclavicular or axillary adenopathy.     Neurological: She is alert.   Skin: No rash noted.        MAMMOGRAM REPORT: She has some diffuse fibronodular tissue; there are no spiculated lesions, distortions or suspicious calcifications noted; NEM    CXR REPORT: Her lungs are well aerated and without worrisome masses; no pleural effusion noted; NEM       ASSESSMENT and PLAN OF CARE     1. Malignant neoplasm of upper-outer quadrant of left breast in female, estrogen receptor positive  Assessment & Plan:  Patient is doing well  and is being followed according to NCCN Guidelines. She understands that her imaging and exams have remained stable and is comfortable being followed in a conservative fashion. She understands the importance of monthly self-breast examination and knows to report any and all changes as they occur.    NOTE:::We viewed her films together at today's visit.  We discussed the multiple views obtained and the important findings.  Even benign changes were mentioned and her questions were answered.     She should continue her Tamoxifen as directed.  We discussed some of the side effects and methods to combat these.  Should she have any issues, she can contact us as needed.    Labs obtained today: CBC, Chem 12, CEA, LDH, CA27/29 - after resulted, these will be compared to her previous values and all abnormal values will be phoned to her for discussion.        Medical Decision Making: HT - It is my impression that the patient suffers from all the listed conditions.  Each of these conditions did affect my Plan of Care and all medical decisions that were rendered.  The medical decision making was of high difficulty based on her comorbidities and her previous diagnosis of breast cancer, which can pose a direct threat to her life.  This patient is currently being treated with ongoing Hormonal Agents that pose inherent risks. Laboratory evaluations are currently pending but will be reviewed in the next 24 hours. Any further recommendations will be communicated to the patient by me.  I have reviewed and verified her allergies, list of medications, medical and surgical histories, social history, and a pertinent review of symptoms.     Follow up:  6 months and prn    For:  Physical Examination, LABS, and US with me

## 2024-10-01 DIAGNOSIS — C50.412 MALIGNANT NEOPLASM OF UPPER-OUTER QUADRANT OF LEFT BREAST IN FEMALE, ESTROGEN RECEPTOR POSITIVE: Primary | ICD-10-CM

## 2024-10-01 DIAGNOSIS — Z17.0 MALIGNANT NEOPLASM OF UPPER-OUTER QUADRANT OF LEFT BREAST IN FEMALE, ESTROGEN RECEPTOR POSITIVE: Primary | ICD-10-CM

## 2024-10-21 ENCOUNTER — OFFICE VISIT (OUTPATIENT)
Dept: SURGERY | Facility: CLINIC | Age: 49
End: 2024-10-21
Payer: COMMERCIAL

## 2024-10-21 ENCOUNTER — LAB VISIT (OUTPATIENT)
Dept: LAB | Facility: HOSPITAL | Age: 49
End: 2024-10-21
Attending: SPECIALIST
Payer: COMMERCIAL

## 2024-10-21 DIAGNOSIS — C50.412 MALIGNANT NEOPLASM OF UPPER-OUTER QUADRANT OF LEFT BREAST IN FEMALE, ESTROGEN RECEPTOR POSITIVE: ICD-10-CM

## 2024-10-21 DIAGNOSIS — C50.412 MALIGNANT NEOPLASM OF UPPER-OUTER QUADRANT OF LEFT BREAST IN FEMALE, ESTROGEN RECEPTOR POSITIVE: Primary | ICD-10-CM

## 2024-10-21 DIAGNOSIS — Z17.0 MALIGNANT NEOPLASM OF UPPER-OUTER QUADRANT OF LEFT BREAST IN FEMALE, ESTROGEN RECEPTOR POSITIVE: Primary | ICD-10-CM

## 2024-10-21 DIAGNOSIS — Z17.0 MALIGNANT NEOPLASM OF UPPER-OUTER QUADRANT OF LEFT BREAST IN FEMALE, ESTROGEN RECEPTOR POSITIVE: ICD-10-CM

## 2024-10-21 LAB
ALBUMIN SERPL BCP-MCNC: 3.9 G/DL (ref 3.5–5.2)
ALP SERPL-CCNC: 41 U/L (ref 40–150)
ALT SERPL W/O P-5'-P-CCNC: 16 U/L (ref 10–44)
ANION GAP SERPL CALC-SCNC: 10 MMOL/L (ref 8–16)
AST SERPL-CCNC: 19 U/L (ref 10–40)
BASOPHILS # BLD AUTO: 0.03 K/UL (ref 0–0.2)
BASOPHILS NFR BLD: 0.6 % (ref 0–1.9)
BILIRUB SERPL-MCNC: 0.4 MG/DL (ref 0.1–1)
BUN SERPL-MCNC: 13 MG/DL (ref 6–20)
CALCIUM SERPL-MCNC: 9.1 MG/DL (ref 8.7–10.5)
CEA SERPL-MCNC: <1.7 NG/ML (ref 0–5)
CHLORIDE SERPL-SCNC: 107 MMOL/L (ref 95–110)
CO2 SERPL-SCNC: 22 MMOL/L (ref 23–29)
CREAT SERPL-MCNC: 0.8 MG/DL (ref 0.5–1.4)
DIFFERENTIAL METHOD BLD: ABNORMAL
EOSINOPHIL # BLD AUTO: 0.2 K/UL (ref 0–0.5)
EOSINOPHIL NFR BLD: 3.5 % (ref 0–8)
ERYTHROCYTE [DISTWIDTH] IN BLOOD BY AUTOMATED COUNT: 14.2 % (ref 11.5–14.5)
EST. GFR  (NO RACE VARIABLE): >60 ML/MIN/1.73 M^2
FSH SERPL-ACNC: 14.75 MIU/ML
GLUCOSE SERPL-MCNC: 89 MG/DL (ref 70–110)
HCT VFR BLD AUTO: 37 % (ref 37–48.5)
HGB BLD-MCNC: 11.7 G/DL (ref 12–16)
IMM GRANULOCYTES # BLD AUTO: 0.01 K/UL (ref 0–0.04)
IMM GRANULOCYTES NFR BLD AUTO: 0.2 % (ref 0–0.5)
LDH SERPL L TO P-CCNC: 177 U/L (ref 110–260)
LYMPHOCYTES # BLD AUTO: 2.3 K/UL (ref 1–4.8)
LYMPHOCYTES NFR BLD: 45.9 % (ref 18–48)
MCH RBC QN AUTO: 28.2 PG (ref 27–31)
MCHC RBC AUTO-ENTMCNC: 31.6 G/DL (ref 32–36)
MCV RBC AUTO: 89 FL (ref 82–98)
MONOCYTES # BLD AUTO: 0.4 K/UL (ref 0.3–1)
MONOCYTES NFR BLD: 8.2 % (ref 4–15)
NEUTROPHILS # BLD AUTO: 2.1 K/UL (ref 1.8–7.7)
NEUTROPHILS NFR BLD: 41.6 % (ref 38–73)
NRBC BLD-RTO: 0 /100 WBC
PLATELET # BLD AUTO: 299 K/UL (ref 150–450)
PMV BLD AUTO: 10.4 FL (ref 9.2–12.9)
POTASSIUM SERPL-SCNC: 3.8 MMOL/L (ref 3.5–5.1)
PROT SERPL-MCNC: 7.1 G/DL (ref 6–8.4)
RBC # BLD AUTO: 4.15 M/UL (ref 4–5.4)
SODIUM SERPL-SCNC: 139 MMOL/L (ref 136–145)
WBC # BLD AUTO: 5.1 K/UL (ref 3.9–12.7)

## 2024-10-21 PROCEDURE — 86300 IMMUNOASSAY TUMOR CA 15-3: CPT | Performed by: SPECIALIST

## 2024-10-21 PROCEDURE — 83615 LACTATE (LD) (LDH) ENZYME: CPT | Performed by: SPECIALIST

## 2024-10-21 PROCEDURE — 99999 PR PBB SHADOW E&M-EST. PATIENT-LVL I: CPT | Mod: PBBFAC,,, | Performed by: SPECIALIST

## 2024-10-21 PROCEDURE — 99214 OFFICE O/P EST MOD 30 MIN: CPT | Mod: S$GLB,,, | Performed by: SPECIALIST

## 2024-10-21 PROCEDURE — 80053 COMPREHEN METABOLIC PANEL: CPT | Performed by: SPECIALIST

## 2024-10-21 PROCEDURE — 82378 CARCINOEMBRYONIC ANTIGEN: CPT | Performed by: SPECIALIST

## 2024-10-21 PROCEDURE — 83001 ASSAY OF GONADOTROPIN (FSH): CPT | Performed by: SPECIALIST

## 2024-10-21 PROCEDURE — 85025 COMPLETE CBC W/AUTO DIFF WBC: CPT | Performed by: SPECIALIST

## 2024-10-23 LAB — CANCER AG27-29 SERPL-ACNC: 15.6 U/ML
